# Patient Record
Sex: FEMALE | Race: WHITE | NOT HISPANIC OR LATINO
[De-identification: names, ages, dates, MRNs, and addresses within clinical notes are randomized per-mention and may not be internally consistent; named-entity substitution may affect disease eponyms.]

---

## 2017-08-01 ENCOUNTER — APPOINTMENT (OUTPATIENT)
Dept: OBGYN | Facility: CLINIC | Age: 62
End: 2017-08-01
Payer: COMMERCIAL

## 2017-08-01 VITALS
HEIGHT: 63 IN | WEIGHT: 153 LBS | SYSTOLIC BLOOD PRESSURE: 160 MMHG | BODY MASS INDEX: 27.11 KG/M2 | DIASTOLIC BLOOD PRESSURE: 80 MMHG

## 2017-08-01 PROCEDURE — 99396 PREV VISIT EST AGE 40-64: CPT

## 2017-08-03 LAB — HPV HIGH+LOW RISK DNA PNL CVX: NEGATIVE

## 2017-08-04 LAB — CYTOLOGY CVX/VAG DOC THIN PREP: NORMAL

## 2018-02-05 ENCOUNTER — EMERGENCY (EMERGENCY)
Facility: HOSPITAL | Age: 63
LOS: 0 days | Discharge: HOME | End: 2018-02-05
Attending: EMERGENCY MEDICINE | Admitting: EMERGENCY MEDICINE
Payer: COMMERCIAL

## 2018-02-05 VITALS
HEART RATE: 83 BPM | OXYGEN SATURATION: 98 % | RESPIRATION RATE: 17 BRPM | DIASTOLIC BLOOD PRESSURE: 67 MMHG | SYSTOLIC BLOOD PRESSURE: 146 MMHG

## 2018-02-05 VITALS — WEIGHT: 156.97 LBS

## 2018-02-05 DIAGNOSIS — M25.571 PAIN IN RIGHT ANKLE AND JOINTS OF RIGHT FOOT: ICD-10-CM

## 2018-02-05 DIAGNOSIS — S93.401A SPRAIN OF UNSPECIFIED LIGAMENT OF RIGHT ANKLE, INITIAL ENCOUNTER: ICD-10-CM

## 2018-02-05 DIAGNOSIS — Z88.8 ALLERGY STATUS TO OTHER DRUGS, MEDICAMENTS AND BIOLOGICAL SUBSTANCES STATUS: ICD-10-CM
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
## 2018-02-05 DIAGNOSIS — Z88.0 ALLERGY STATUS TO PENICILLIN: ICD-10-CM

## 2018-02-05 PROCEDURE — 99284 EMERGENCY DEPT VISIT MOD MDM: CPT

## 2018-02-05 PROCEDURE — 73630 X-RAY EXAM OF FOOT: CPT | Mod: 26,LT

## 2018-02-05 PROCEDURE — 73610 X-RAY EXAM OF ANKLE: CPT | Mod: 26,RT

## 2018-02-05 RX ORDER — IBUPROFEN 200 MG
600 TABLET ORAL ONCE
Qty: 0 | Refills: 0 | Status: COMPLETED | OUTPATIENT
Start: 2018-02-05 | End: 2018-02-05

## 2018-02-05 RX ADMIN — Medication 600 MILLIGRAM(S): at 17:19

## 2018-02-05 NOTE — ED STATDOCS - CARE PLAN
Principal Discharge DX:	Sprain of right ankle, initial encounter  Secondary Diagnosis:	Foot injury, right, initial encounter

## 2018-02-05 NOTE — ED STATDOCS - MEDICAL DECISION MAKING DETAILS
ankle injury s/p hyper plantar flexion. Likely sprain however due to inability to bear weight will do x-ray, give Ibuprofen and reassess.

## 2018-02-05 NOTE — ED ADULT NURSE NOTE - OBJECTIVE STATEMENT
pt presents to ED with rigth ankle pain s/p slip and fall on ice today. pt denies hitting her head. denies LOC. pt denies use of anticoagulants. a&ox3. pt ambulates with pain. will continue to monitor and reassess

## 2018-02-05 NOTE — ED STATDOCS - NS_ ATTENDINGSCRIBEDETAILS _ED_A_ED_FT
Bobby Seo DO (Attending): The history, relevant review of systems, past medical and surgical history, medical decision making, and physical examination was documented by the scribe in my presence and I attest to the accuracy of the documentation.

## 2018-02-05 NOTE — ED STATDOCS - MUSCULOSKELETAL, MLM
Right ankle: tenderness to anterior medial ankle. Minimal bony tenderness. Tenderness worth with hyper plantar flexion.

## 2018-02-05 NOTE — ED STATDOCS - PROGRESS NOTE DETAILS
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 State only pain located in the anterior R ankle. Small area of redness to the dorsal midfoot without ttp. Likely sprain. -Wali Nesbitt PA-C

## 2018-08-27 ENCOUNTER — APPOINTMENT (OUTPATIENT)
Dept: OBGYN | Facility: CLINIC | Age: 63
End: 2018-08-27
Payer: COMMERCIAL

## 2018-08-27 VITALS
WEIGHT: 157 LBS | HEIGHT: 63 IN | DIASTOLIC BLOOD PRESSURE: 80 MMHG | SYSTOLIC BLOOD PRESSURE: 150 MMHG | BODY MASS INDEX: 27.82 KG/M2

## 2018-08-27 DIAGNOSIS — N95.2 POSTMENOPAUSAL ATROPHIC VAGINITIS: ICD-10-CM

## 2018-08-27 PROCEDURE — 99396 PREV VISIT EST AGE 40-64: CPT

## 2018-08-27 PROCEDURE — 99213 OFFICE O/P EST LOW 20 MIN: CPT | Mod: 25

## 2018-11-09 DIAGNOSIS — R14.0 ABDOMINAL DISTENSION (GASEOUS): ICD-10-CM

## 2019-06-05 ENCOUNTER — RESULT REVIEW (OUTPATIENT)
Age: 64
End: 2019-06-05

## 2019-11-06 ENCOUNTER — TRANSCRIPTION ENCOUNTER (OUTPATIENT)
Age: 64
End: 2019-11-06

## 2019-11-07 ENCOUNTER — APPOINTMENT (OUTPATIENT)
Dept: OBGYN | Facility: CLINIC | Age: 64
End: 2019-11-07
Payer: COMMERCIAL

## 2019-11-07 VITALS
SYSTOLIC BLOOD PRESSURE: 132 MMHG | TEMPERATURE: 98 F | WEIGHT: 157 LBS | HEIGHT: 63 IN | DIASTOLIC BLOOD PRESSURE: 78 MMHG | BODY MASS INDEX: 27.82 KG/M2

## 2019-11-07 PROCEDURE — 99396 PREV VISIT EST AGE 40-64: CPT

## 2019-11-07 NOTE — HISTORY OF PRESENT ILLNESS
[1 Year Ago] : 1 year ago [Regular Exercise] : regular exercise [Healthy Diet] : a healthy diet [Last Mammogram ___] : Last Mammogram was [unfilled] [Last Colonoscopy ___] : Last colonoscopy [unfilled] [Last Bone Density ___] : Last bone density studies [unfilled] [Last Pap ___] : Last cervical pap smear was [unfilled] [Weight Concerns] : no concerns with her weight

## 2019-11-07 NOTE — CHIEF COMPLAINT
[Annual Visit] : annual visit [FreeTextEntry1] : Pt here for annual visit. Pt complains of left nipple "splitting" over past few months has seen dermatologist about this and he thinks it is psoriasis. Has been putting A&D ointment to help. Pt reports its bothersome and has appt with the dermatologist next week. No urinary complaints. No vaginal bleeding. Taking vitamin D, Pt walks daily. No blood in stool. Pt currently SA, estrace cream caused cramping after using it for one week and stopped. Occasional hot flashes and night sweats, tolerable.

## 2019-11-07 NOTE — PHYSICAL EXAM
[Awake] : awake [Alert] : alert [Acute Distress] : no acute distress [Thyroid Nodule] : no thyroid nodule [LAD] : no lymphadenopathy [Goiter] : no goiter [Mass] : no breast mass [Nipple Discharge] : no nipple discharge [Axillary LAD] : no axillary lymphadenopathy [No Discharge] : no discharge [Tender] : non tender [Soft] : soft [Distended] : not distended [H/Smegaly] : no hepatosplenomegaly [Oriented x3] : oriented to person, place, and time [Depressed Mood] : not depressed [Flat Affect] : affect not flat [No Bleeding] : there was no active vaginal bleeding [Normal] : uterus [Pap Obtained] : a Pap smear was performed [Normal Position] : in a normal position [Tenderness] : nontender [Enlarged ___ wks] : not enlarged [Mass ___ cm] : no uterine mass was palpated [Uterine Adnexae] : were not tender and not enlarged [Adnexa Tenderness] : were not tender [No Tenderness] : no rectal tenderness [Ovarian Mass (___ Cm)] : there were no adnexal masses [RRR, No Murmurs] : RRR, no murmurs [Occult Blood] : occult blood test from digital rectal exam was negative [CTAB] : CTAB

## 2019-11-14 LAB — HPV HIGH+LOW RISK DNA PNL CVX: NOT DETECTED

## 2020-10-06 ENCOUNTER — TRANSCRIPTION ENCOUNTER (OUTPATIENT)
Age: 65
End: 2020-10-06

## 2020-12-04 ENCOUNTER — TRANSCRIPTION ENCOUNTER (OUTPATIENT)
Age: 65
End: 2020-12-04

## 2020-12-10 NOTE — ED STATDOCS - OBJECTIVE STATEMENT
[facility] Hospital Medicine  Progress Note    Patient Name: Navin Beck  MRN: [id number]  Patient Class: IP- Inpatient   Admission Date: 11/26/2020  Length of Stay: 13 days  Attending Physician: Alessia Johnston MD  Primary Care Provider: Elvira Quinones MD    [facility] Medicine Team: [facility]OS[facility] MED 2 Stefano Dimas MD    Subjective:     Principal Problem:Acute kidney injury (LAURIE) with acute tubular necrosis (ATN)        HPI:  Ms. Beck is a 62yo F with PMH of HTN, HLD, T2DM, Hyperthyroidism, and Asthma who had recent GYN surgery on 10/26 due to erosion of bladder mesh / prosthetic material with Cifuentes removal on 11/19. She presents to [facility] due to 4-5 days of not feeling well, she has had loss of appetite and weakness with 10/10 throbbing lower abdominal pain associated with vaginal pain and rectal pressure and non-bloody diarrhea. She has had trouble urinating for the past 5 days as well. She takes oxycodone 5 at home for pain, but it did not help. She endorses nausea and vomiting (4 times, non-bloody) also associated with the pain. She denies fever, chills, chest pain, SOB, trouble breathing, leg swelling, or any other symptom at this time.    ED Course: VSS, afebrile but leukocytosis at 17.85. Pt found to have DKA - Gluc 626, AG16, BHB 4.2, Fluids, insulin gtt started and Glucose down-trended to 200s. UA showed 2RBC, >100 WBC, bacteria, given CTX. CT A/P showed multiple fluid collections adjacent to bladder concerning for bladder perf with multiple urinomas, with mild bilateral hydronephrosis. Urology consulted and placed a Cifuentes with 900 UOP drained. GYN consulted due to recent surgery.     Pt will be admitted to hospital medicine for further management of DKA and possible intra-abdominal infection.     Overview/Hospital Course:  Pt was admitted on 11/28 due to 10/10 abdominal pain and also found to be in DKA. Pt was started on IVF, insulin gtt, and kept NPO with glucose  "downtrending and AG closed. CT abdomen showed multiple fluid collections near bladder. Renal cystography with "hypoattenuating collections in the lower pelvis adjacent to the urinary bladder with no definite extravasated contrast material appreciated within the pelvic collections to suggest communication with the bladder".  Urology and GYN on board, agree with plan for IR to place drain in fluid collections for culture and source control. Pt was started on CTX and Flagyl to cover for UTI and intra-abdominal infection, broadened to Cefepime, and Flagyl on 11/26; vanc added later. Endocrine consulted and managed the patient while acutely ill on insulin drip then transitioned to SQ when BG stabilized. IR place low abdominal drain to abscess on 11/28 with 250ml purulent material immediately expressed. Drained remained in place after the procedure. Urology continued to assess patient and recommended CT RSS after identifying some left CVAT and increased SCr with reduced UOP on 11/29, no hydronephrosis does not believe LAURIE is post-renal in nature. 11/30 pt remains anuric, Cr up-trending to 4.2, with hyponatremia. Nephrology consulted. 12/1 pt remains anuric after Lasix + Diuril challenge. Nephrology decided for initiation of CRRT due to low pressures with unresponsive ARF. Will transfer to ICU for CRRT. Pt transferred back to floor as no more need for CRRT on 12/5. Cystoscopy with retrograde pyleogram for 12/7 by Urology    Interval History/Significant Events:   Weaning O2 with goal of 88%-90% due to history of lung disease. Patient afebrile and reports improvement in abdominal pain.     Review of Systems   Constitutional: Positive for activity change, appetite change and fatigue. Negative for chills and fever.   HENT: Negative for hearing loss, rhinorrhea, sneezing, sore throat and trouble swallowing.    Respiratory: Negative for cough, shortness of breath and wheezing.    Cardiovascular: Negative for chest pain, " palpitations and leg swelling.   Gastrointestinal: Negative for abdominal pain, blood in stool, constipation, diarrhea, nausea, rectal pain and vomiting.   Genitourinary: Negative for difficulty urinating, dysuria, flank pain and pelvic pain.   Musculoskeletal: Negative for arthralgias, neck pain and neck stiffness.   Skin: Negative for color change, pallor and rash.   Neurological: Negative for syncope, weakness, light-headedness, numbness and headaches.   Psychiatric/Behavioral: Negative for confusion and hallucinations. The patient is not nervous/anxious.      Objective:     Vital Signs (Most Recent):  Temp: 97.9 °F (36.6 °C) (12/10/20 0733)  Pulse: 76 (12/10/20 1500)  Resp: 16 (12/10/20 1437)  BP: (!) 140/65 (12/10/20 0733)  SpO2: 96 % (12/10/20 0837) Vital Signs (24h Range):  Temp:  [97.9 °F (36.6 °C)-99.2 °F (37.3 °C)] 97.9 °F (36.6 °C)  Pulse:  [] 76  Resp:  [16-20] 16  SpO2:  [95 %-99 %] 96 %  BP: (122-140)/(56-65) 140/65   Weight: 109.5 kg (241 lb 6.5 oz)  Body mass index is 37.81 kg/m².      Intake/Output Summary (Last 24 hours) at 12/10/2020 1529  Last data filed at 12/10/2020 1100  Gross per 24 hour   Intake 300 ml   Output 2680 ml   Net -2380 ml       Physical Exam  Constitutional:       General: She is not in acute distress.     Appearance: She is obese. She is not ill-appearing or toxic-appearing.   HENT:      Head: Normocephalic and atraumatic.      Nose: No congestion or rhinorrhea.      Mouth/Throat:      Pharynx: No oropharyngeal exudate or posterior oropharyngeal erythema.   Eyes:      General: No scleral icterus.        Right eye: No discharge.         Left eye: No discharge.      Extraocular Movements: Extraocular movements intact.      Pupils: Pupils are equal, round, and reactive to light.   Neck:      Musculoskeletal: Normal range of motion and neck supple. No neck rigidity or muscular tenderness.   Cardiovascular:      Rate and Rhythm: Normal rate and regular rhythm.      Pulses:  Normal pulses.      Heart sounds: No murmur.   Pulmonary:      Effort: Pulmonary effort is normal. No respiratory distress.      Breath sounds: Normal breath sounds. No stridor. No wheezing.   Abdominal:      General: Abdomen is flat. Bowel sounds are normal. There is no distension.      Palpations: Abdomen is soft. There is no mass.      Tenderness: There is abdominal tenderness (LLQ, drain in place with minimal pustular yellow fluid, dressings CDI).      Hernia: No hernia is present.   Musculoskeletal: Normal range of motion.         General: No swelling, tenderness or deformity.   Skin:     General: Skin is warm and dry.      Coloration: Skin is not jaundiced or pale.      Findings: No bruising.   Neurological:      General: No focal deficit present.      Mental Status: She is alert. Mental status is at baseline.         Vents:  Oxygen Concentration (%): 28 (12/08/20 0211)  Lines/Drains/Airways     Drain                 Closed/Suction Drain 11/28/20 1358 Right Abdomen Bulb 10 Fr. 12 days         Urethral Catheter 12/07/20 1413 Latex;Straight-tip 18 Fr. 3 days          Peripheral Intravenous Line                 Peripheral IV - Single Lumen 12/07/20 20 G Right;Lateral Upper Arm 3 days              Significant Labs:    CBC/Anemia Profile:  Recent Labs   Lab 12/09/20  0909 12/10/20  0301   WBC 11.50 9.67   HGB 9.0* 8.2*   HCT 29.8* 26.9*   * 354*   MCV 95 94   RDW 14.8* 14.8*        Chemistries:  Recent Labs   Lab 12/09/20  0909 12/10/20  0300   * 135*   K 3.6 4.2   CL 93* 97   CO2 31* 28   BUN 27* 30*   CREATININE 4.7* 4.4*   CALCIUM 7.9* 7.6*   ALBUMIN 2.2* 1.9*   PROT 7.1 6.3   BILITOT 0.1 0.1   ALKPHOS 566* 410*   ALT 9* 6*   AST 29 13   MG 1.4* 1.7   PHOS 5.3* 4.7*       BMP:   Recent Labs   Lab 12/10/20  0300   *   *   K 4.2   CL 97   CO2 28   BUN 30*   CREATININE 4.4*   CALCIUM 7.6*   MG 1.7     CMP:   Recent Labs   Lab 12/09/20  0909 12/10/20  0300   * 135*   K 3.6 4.2   CL  93* 97   CO2 31* 28   * 311*   BUN 27* 30*   CREATININE 4.7* 4.4*   CALCIUM 7.9* 7.6*   PROT 7.1 6.3   ALBUMIN 2.2* 1.9*   BILITOT 0.1 0.1   ALKPHOS 566* 410*   AST 29 13   ALT 9* 6*   ANIONGAP 11 10   EGFRNONAA 9.3* 10.1*       Significant Imaging:  I have reviewed all pertinent imaging results/findings within the past 24 hours.      Assessment/Plan:      * Acute kidney injury (LAURIE) with acute tubular necrosis (ATN)  Cr 1.5 on presentation.   Likely multi-factorial on presentation 2/2 pre-renal volume depletion in the context of DKA, UTI, and post-renal obstruction  Repeat LAURIE on 11/29 likely intrinsic ATN in etiology    Recent Labs   Lab 12/06/20  0752 12/07/20  0511 12/08/20  0354 12/09/20  0909 12/10/20  0300   CREATININE 4.5* 5.1* 5.3* 4.7* 4.4*     PLAN  - Initial LAURIE on presentation resolved with IVF and placement of Cifuentes with resolution of hydronephrosis on imaging  - UTI adequately treated as she has been on continued ABX for other indications  - Nephrology consulted, appreciate recs   - Initiation of CRRT, transfer to ICU on 12/2   - Transferred to floor on 12/4   - Pt made ~1L urine after Lasix + Diuril challenge   - May be in recovery phase of LAURIE   - Renally dose meds   - Avoid renal toxins (ACEi/ARB, NSAIDs, contrast, etc.)   - Strict I&Os   - Trialysis catheter to RIJ removed on 12/9    Abnormal computed tomography of bladder  Pt presented to [facility] due to 5-6 days of 10/10 lower abdomen, vaginal, and rectal pain. Pt has recent h/o GYN surgery in 10/2020 for bladder mesh removal, Cifuentes was removed on 11/19.  CT A/P 11/27: Multiple fluid collections adjacent to bladder. Mild bilateral hydronephrosis.   CT A/P 12/8: Interval resolution of previously identified multilobular fluid collection in the lower pelvis with drainage catheter in position.  Residual scattered small volume of air is noted.   UCx 11/26: E.coli sensitive to CTX, cefepime   BCx 12/5: Peptostrptococcus   Cystography 12/6: No  bladder leak    PLAN  - GYN consulted, appreciate consult.  - IR consulted, appreciate recs; Drain in places.  - Pain management: PCA pump initially. PCA pump d/c on 11/30. PRN opiates  - ID consulted for intra-abdominal infection, appreciate recs   - CTX & Flagyl x14 days, repeat imaging to assess for cleared infection prior to d/c abx   - If d/c before 14d course, may switch to augmentin   - 12/9: broadened ABX to cover HAP, switched CTX-->Cefepime, added vanc, and continued flagyl              - 12/10: Transitioned patient levaquin + metronidazole.   - Urology recs appreciated; left CVAT, ordered CT RSS 11/29 hydronephrosis resolved   - Maintain Cifuentes    - 12/6 spoke with urology concerning ROSAURA drain output concern for urinary contents.    - Per Urology, since ROSAURA fluid Cr 1.7 is less than serum Cr 4.5, the fluid is not urine.    - Negative cystogram and normal retrograde pyelogram        HAP (hospital-acquired pneumonia)  Hospital acquired pneumonia (HAP)    Patient on 3L NC, although sat 97-98%, likely can wean significantly. Patient with longstanding obstructive lung disease (asthma) -- goal sat >88%.    12/7 CXR: Interval progression in the extent of right perihilar and right lower lung zone infiltrate and or atelectasis.  New and or mildly increased small right pleural effusion.   12/8 CTAP: Bibasilar consolidative changes, right more than left with small volume right-sided pleural effusion.  Findings likely related to pneumonia versus inflammatory process.    12/9 CT Chest w/o Contrast:   · Bilateral lower lobe consolidative opacities right greater than left and bandlike atelectasis bilaterally with trace pleural effusions similar to exam 12/08/2020.  Differential diagnosis includes aspiration, pneumonia, atelectasis, and less likely noninfectious inflammation.  · Slight ground-glass opacities predominantly right apex which may represent infectious or noninfectious inflammatory etiology.   · Partially aerated  retained secretions within the trachea.   · Scattered calcified mediastinal lymph nodes and soft tissue density in the anterior mediastinum relatively unchanged dating back to 07/27/2012.     - Initially Abx broadened due to concern for HCAP given oxygen sats, however given that patient is afebrile, history of Asthma, and bilateral nature of consolidative changes, this may be atelectasis. Will start patient on Levaquin and monitor overnight. If afebrile will discharge patient on Levaquin.  - Flagyl is for pelvic abscess, but will also cover anaerobes if aspiration   - Tracheal secretions in airway noted on CT -- will add BID cough assists for help with expectoration  - wean supplemental O2 for goal sat >88% (pt with chronic obstructive lung disease - asthma)    Yeast infection  - Fluconazole 150mg PO    Elevated alkaline phosphatase level  Alk Phos has been trending up this hospitalization, on 12/9 . Unclear etiology. Other LFTs wnl - T bili, AST/ALT.  Recent Labs   Lab 12/10/20  0300   ALT 6*   AST 13   ALKPHOS 410*   BILITOT 0.1   PROT 6.3   ALBUMIN 1.9*     Recent Labs   Lab 12/09/20  0909   *     - Will review medication list to evaluate for possible drug-induced elevation    Anemia  Pt Hb 9.8 on admit. BL ~10.   Most likely due to anemia of chronic disease, acute blood loss, and frequent phlebotomy while hospitalized.   Recent Labs   Lab 12/07/20  0511 12/08/20  0354 12/09/20  0909 12/10/20  0301   HGB 7.9* 8.0* 9.0* 8.2*     - Required 1U PRBC on 12/2  - Required 1U PRBC on 12/6 2/2     Impaired mobility and endurance    - PT/OT    Hyponatremia  Earlier this howpitalizarion hyponatremia to 120s with spontaneous jerking motions 11/29. Na now normalized. Nephrology continuing to follow for LAURIE. Possibly 2/2 D5 IVF with decreased UOP while in DKA. Fluid restrict 1.5L  Recent Labs   Lab 12/06/20  0752 12/07/20  0511 12/08/20  0354 12/09/20  0909 12/10/20  0300   * 136 134* 135* 135*       -  Nephrology on board, appreciate recs      Postprocedural intraabdominal abscess  Mesh removed by GYN 10/26; march placed  March removed 11/19 --> increased pain  Admitted 11/27 with worsening abd pain found to be related to intra-abd fluid collections.   UCx 11/26 with E.coli    - Urology consulted; lesions adjacent to bladder per CT cysto 11/27  - Gyn consulted  - IR consulted drained abscess 11/28  - Cultures: Peptostreptococcus pending margy.  - Broad spectrum abx: CTX, Flagyl D11/14 --> broadened to vanc/cefepime/flagyl 12/9 to cover HAP  --> Now on levaquin/flagyl      UTI (urinary tract infection)  Pt presented with lower abdominal/vaginal/rectal pain with dysuria and urinary retention.   UA: 2 RBC, >100 WBC, 2+ leukocytes, bacteria  Recent UTIs with pan-sensitive E coli.    - UCx with E coli sens to cefepime and ceftriaxone  - March placed for urinary retention, maintain March per Urology  - Remains on ABX for abscess and now HAP -- UTI has been adequately treated at this point      Urinary retention  Pt has recent h/o GYN surgery in 10/2020 for bladder mesh removal, March was removed on 11/19. For the past few days pt has had increased trouble urinating associated with pain.   CT A/P 11/27: Multiple fluid collections adjacent to bladder. Mild bilateral hydronephrosis.   Retention likely 2/2 anatomic obstruction, pelvic fluid collection, diabetic bladder  - Urology consulted in ED, appreciate recs   - March placed 800cc drained, bladder irrigated and all fluid removed with ease   - Maintain March   - No hydronephrosis seen on CT, most likely not post-renal in etiology    Type 2 diabetes mellitus with hyperglycemia, with long-term current use of insulin  Last Hb A1c 10.3 on 9/25/20  Home regimen: Metformin 500 BID, Aspart 15U TID, Glargine 60 BID  Pt says her blood sugar has been running to the 300s for the past few days  - Hold oral anti-glycemics while hospitalized  - Endocrine consulted, recs appreciated  -  63 y/o female with no relevant PMHx presents to the ED c/o right ankle pain s/p trip and fall this morning. Pt reports severe pain when standing and is ambulatory with pain. Pt has an aching pain in right calf muscle. No fever or any other acute complaints at this time. Pt took 3 81mg ASA for pain at 2:30pm. Pt is a physical therapist. Allergic to Penicillin, Azithromycin, Demerol. No daily medications. Pt presents with ace bandage. PMD: Dr. Tahir Henry. Accu-checks TIDAC + qhs  - See DKA notes      Asthma  Pt states she uses Albuterol inhaler TID every day.   - Duonebs q6 PRN    Diabetic ketoacidosis without coma associated with type 2 diabetes mellitus  Pt presented to [facility] [place] [place] ED with Gluc 626, HCO3 16, AG 16, BHB 4.2, ketonuria. 2L IVF given in ED with insulin gtt started. Glucose trended downward to 274. Pt was then admitted to hospital medicine for further management of DKA.      - Endocrine consulted, appreciate recs   - Transitional Insulin gtt stopped 12/1   - Gluc regimen increased 2/2 gluc 230-240s    Detemir 34U BID    Aspart 17U TID WM   - Goal gluc 140-180   - Diabetic diet  - BMP daily  - Will replace K PRN  - Nausea: zofran PRN     Hyperthyroidism  Continue home Methimazole      - TSH WNL  - Endocrine aware    Severe obesity (BMI 35.0-35.9 with comorbidity)  -  on exercise, diet, and weight management     Mixed hyperlipidemia  - Continue home statin      Essential hypertension  Hold home Benazepril due to LAURIE, low BP    - Will monitor BP and add anti-hypertensive as necessary       VTE Risk Mitigation (From admission, onward)         Ordered     heparin (porcine) injection 5,000 Units  Every 8 hours      12/01/20 1426     Place sequential compression device  Until discontinued      12/01/20 1409     IP VTE HIGH RISK PATIENT  Once      12/01/20 1409                Discharge Planning   RAFA: 12/14/2020     Code Status: Full Code   Is the patient medically ready for discharge?: No    Reason for patient still in hospital (select all that apply): Patient trending condition  Discharge Plan A: Home with family   Discharge Delays: None known at this time              Stefano Dimas MD  Department of Hospital Medicine   [facility]

## 2021-01-15 ENCOUNTER — APPOINTMENT (OUTPATIENT)
Dept: OBGYN | Facility: CLINIC | Age: 66
End: 2021-01-15
Payer: COMMERCIAL

## 2021-01-15 VITALS
BODY MASS INDEX: 27.82 KG/M2 | WEIGHT: 157 LBS | SYSTOLIC BLOOD PRESSURE: 130 MMHG | HEIGHT: 63 IN | DIASTOLIC BLOOD PRESSURE: 70 MMHG

## 2021-01-15 DIAGNOSIS — Z01.419 ENCOUNTER FOR GYNECOLOGICAL EXAMINATION (GENERAL) (ROUTINE) W/OUT ABNORMAL FINDINGS: ICD-10-CM

## 2021-01-15 DIAGNOSIS — Z12.31 ENCOUNTER FOR SCREENING MAMMOGRAM FOR MALIGNANT NEOPLASM OF BREAST: ICD-10-CM

## 2021-01-15 PROCEDURE — 99396 PREV VISIT EST AGE 40-64: CPT

## 2021-01-15 PROCEDURE — 99072 ADDL SUPL MATRL&STAF TM PHE: CPT

## 2021-01-15 RX ORDER — LOSARTAN POTASSIUM 25 MG/1
25 TABLET, FILM COATED ORAL
Refills: 0 | Status: ACTIVE | COMMUNITY

## 2021-01-15 RX ORDER — ESTRADIOL 0.1 MG/G
0.1 CREAM VAGINAL
Qty: 1 | Refills: 2 | Status: DISCONTINUED | COMMUNITY
Start: 2018-08-27 | End: 2021-01-15

## 2021-01-15 NOTE — HISTORY OF PRESENT ILLNESS
[No] : Patient does not have concerns regarding sex [Currently Active] : currently active [Men] : men [Vaginal] : vaginal [TextBox_4] : BMD last yr showed osteoporosis, she had  FU with  endocrine no meds rx'd\par Dribbling began over the summer, urine in underwear, no dysuria\par RHINA with sneezing, stable\par no urge incontinence, not bothering her.\par Fissure on left nipple when seasons change, coconut oil helps\par no vb\par sa recently , no pain or dryness\par  [Mammogramdate] : 2/19 [PapSmeardate] : 2019 [BoneDensityDate] : 12/19 [TextBox_43] : due in March [ColonoscopyDate] : 6/19

## 2021-01-16 LAB — HPV HIGH+LOW RISK DNA PNL CVX: NOT DETECTED

## 2021-01-21 ENCOUNTER — TRANSCRIPTION ENCOUNTER (OUTPATIENT)
Age: 66
End: 2021-01-21

## 2021-02-25 DIAGNOSIS — N64.89 OTHER SPECIFIED DISORDERS OF BREAST: ICD-10-CM

## 2021-11-10 ENCOUNTER — TRANSCRIPTION ENCOUNTER (OUTPATIENT)
Age: 66
End: 2021-11-10

## 2021-12-13 ENCOUNTER — TRANSCRIPTION ENCOUNTER (OUTPATIENT)
Age: 66
End: 2021-12-13

## 2021-12-14 ENCOUNTER — TRANSCRIPTION ENCOUNTER (OUTPATIENT)
Age: 66
End: 2021-12-14

## 2024-03-12 ENCOUNTER — TELEPHONE (OUTPATIENT)
Dept: INTERNAL MEDICINE CLINIC | Facility: CLINIC | Age: 69
End: 2024-03-12

## 2024-03-12 NOTE — TELEPHONE ENCOUNTER
Patient requested a new patient appointment for and here  but only wanted to schedule with Dr. Freitas and said she would try back later

## 2024-04-10 ENCOUNTER — INITIAL CONSULT (OUTPATIENT)
Age: 69
End: 2024-04-10
Payer: MEDICARE

## 2024-04-10 VITALS
BODY MASS INDEX: 27.46 KG/M2 | HEIGHT: 63 IN | WEIGHT: 155 LBS | DIASTOLIC BLOOD PRESSURE: 78 MMHG | SYSTOLIC BLOOD PRESSURE: 122 MMHG | HEART RATE: 73 BPM

## 2024-04-10 DIAGNOSIS — R94.31 ABNORMAL EKG: Primary | ICD-10-CM

## 2024-04-10 DIAGNOSIS — R94.31 EKG, ABNORMAL: ICD-10-CM

## 2024-04-10 DIAGNOSIS — I10 PRIMARY HYPERTENSION: ICD-10-CM

## 2024-04-10 PROCEDURE — 99204 OFFICE O/P NEW MOD 45 MIN: CPT | Performed by: INTERNAL MEDICINE

## 2024-04-10 PROCEDURE — 1090F PRES/ABSN URINE INCON ASSESS: CPT | Performed by: INTERNAL MEDICINE

## 2024-04-10 PROCEDURE — 3074F SYST BP LT 130 MM HG: CPT | Performed by: INTERNAL MEDICINE

## 2024-04-10 PROCEDURE — 3078F DIAST BP <80 MM HG: CPT | Performed by: INTERNAL MEDICINE

## 2024-04-10 PROCEDURE — 1036F TOBACCO NON-USER: CPT | Performed by: INTERNAL MEDICINE

## 2024-04-10 PROCEDURE — G8428 CUR MEDS NOT DOCUMENT: HCPCS | Performed by: INTERNAL MEDICINE

## 2024-04-10 PROCEDURE — 1123F ACP DISCUSS/DSCN MKR DOCD: CPT | Performed by: INTERNAL MEDICINE

## 2024-04-10 PROCEDURE — 93000 ELECTROCARDIOGRAM COMPLETE: CPT | Performed by: INTERNAL MEDICINE

## 2024-04-10 PROCEDURE — G8400 PT W/DXA NO RESULTS DOC: HCPCS | Performed by: INTERNAL MEDICINE

## 2024-04-10 PROCEDURE — 3017F COLORECTAL CA SCREEN DOC REV: CPT | Performed by: INTERNAL MEDICINE

## 2024-04-10 PROCEDURE — G8419 CALC BMI OUT NRM PARAM NOF/U: HCPCS | Performed by: INTERNAL MEDICINE

## 2024-04-10 RX ORDER — PERPHENAZINE/AMITRIPTYLINE HCL 2 MG-10 MG
4 TABLET ORAL DAILY
COMMUNITY

## 2024-04-10 RX ORDER — BIOTIN 1 MG
TABLET ORAL DAILY
COMMUNITY

## 2024-04-10 RX ORDER — SODIUM FLUORIDE 1.1 G/100G
CREAM ORAL
COMMUNITY
Start: 2024-02-05

## 2024-04-10 RX ORDER — EPINEPHRINE 0.3 MG/.3ML
0.3 INJECTION SUBCUTANEOUS
COMMUNITY
Start: 2022-02-09

## 2024-04-10 RX ORDER — LOSARTAN POTASSIUM 25 MG/1
25 TABLET ORAL DAILY
COMMUNITY
Start: 2024-02-09

## 2024-04-10 NOTE — PROGRESS NOTES
[address] [facility], [place] [address]  PHONE: [phone]        04/10/24    NAME:  Marylu Forde  : 1955  MRN: [id number]       SUBJECTIVE:   Marylu Forde is a 69 y.o. female seen for a consultation visit regarding the following:     Chief Complaint   Patient presents with    Consultation    Hypertension          HPI:  Consultation is requested by Roberto Carlos Hill MD for evaluation of Consultation and Hypertension  Here for abnormal EKG.  CA score 11 in .    Some msk issues now, walking some now.  No angina now.  No KAMARA, SOB, CP.  5-10k steps per day.   Patient denies recent history of orthopnea, PND, excessive dizziness and/or syncope.      Retired PT      Past Medical History, Past Surgical History, Family history, Social History, and Medications were all reviewed with the patient today and updated as necessary.       Current Outpatient Medications   Medication Sig Dispense Refill    losartan (COZAAR) 25 MG tablet Take 1 tablet by mouth daily      Biotin 1000 MCG TABS Take by mouth daily      Calcium Fructoborate POWD Take 1 capsule by mouth daily      EPINEPHrine (EPIPEN) 0.3 MG/0.3ML SOAJ injection Inject 0.3 mLs into the muscle once as needed      DENTA 5000 PLUS 1.1 % CREA USE AS DIRECTED      Astaxanthin 4 MG CAPS Take 4 mg by mouth daily      TURMERIC PO Take 1 capsule by mouth daily      Cyanocobalamin (VITAMIN B 12 PO) Take by mouth      Magnesium Chloride (MAGNESIUM DR PO) Take by mouth      Calcium-Vitamin D-Vitamin K (CALCIUM FOR WOMEN PO) Take by mouth       No current facility-administered medications for this visit.        Allergies   Allergen Reactions    Penicillins Shortness Of Breath    Erythromycin Nausea And Vomiting     Patient Active Problem List    Diagnosis Date Noted    EKG, abnormal 04/10/2024    Primary hypertension 04/10/2024      No past surgical history on file.  Family History   Problem Relation Age of Onset    Arrhythmia Brother

## 2024-07-17 ENCOUNTER — OFFICE VISIT (OUTPATIENT)
Age: 69
End: 2024-07-17
Payer: MEDICARE

## 2024-07-17 VITALS
SYSTOLIC BLOOD PRESSURE: 118 MMHG | WEIGHT: 157 LBS | HEART RATE: 71 BPM | BODY MASS INDEX: 27.82 KG/M2 | DIASTOLIC BLOOD PRESSURE: 70 MMHG | HEIGHT: 63 IN

## 2024-07-17 DIAGNOSIS — I10 PRIMARY HYPERTENSION: Primary | ICD-10-CM

## 2024-07-17 DIAGNOSIS — R94.31 EKG, ABNORMAL: ICD-10-CM

## 2024-07-17 PROCEDURE — 1090F PRES/ABSN URINE INCON ASSESS: CPT | Performed by: INTERNAL MEDICINE

## 2024-07-17 PROCEDURE — 3017F COLORECTAL CA SCREEN DOC REV: CPT | Performed by: INTERNAL MEDICINE

## 2024-07-17 PROCEDURE — 3078F DIAST BP <80 MM HG: CPT | Performed by: INTERNAL MEDICINE

## 2024-07-17 PROCEDURE — 99213 OFFICE O/P EST LOW 20 MIN: CPT | Performed by: INTERNAL MEDICINE

## 2024-07-17 PROCEDURE — G8428 CUR MEDS NOT DOCUMENT: HCPCS | Performed by: INTERNAL MEDICINE

## 2024-07-17 PROCEDURE — 1036F TOBACCO NON-USER: CPT | Performed by: INTERNAL MEDICINE

## 2024-07-17 PROCEDURE — 3074F SYST BP LT 130 MM HG: CPT | Performed by: INTERNAL MEDICINE

## 2024-07-17 PROCEDURE — G8419 CALC BMI OUT NRM PARAM NOF/U: HCPCS | Performed by: INTERNAL MEDICINE

## 2024-07-17 PROCEDURE — G8400 PT W/DXA NO RESULTS DOC: HCPCS | Performed by: INTERNAL MEDICINE

## 2024-07-17 PROCEDURE — 1123F ACP DISCUSS/DSCN MKR DOCD: CPT | Performed by: INTERNAL MEDICINE

## 2024-07-17 NOTE — PROGRESS NOTES
[address] [facility], [place] [address]  PHONE: [phone]     24    NAME:  Marylu Forde  : 1955  MRN: [id number]       SUBJECTIVE:   Marylu Forde is a 69 y.o. female seen for a follow up visit regarding the following:     Chief Complaint   Patient presents with    Hypertension    Follow-up     Echo        HPI: Here for abnormal EKG.  CA score 11 in .    Echo 2024: normal EF, mild MR    Walking daily, no angina.  Some msk issues now, walking some now.  No angina now.  No KAMARA, SOB, CP.      Patient denies recent history of orthopnea, PND, excessive dizziness and/or syncope.        Retired PT       Past Medical History, Past Surgical History, Family history, Social History, and Medications were all reviewed with the patient today and updated as necessary.     Current Outpatient Medications   Medication Sig Dispense Refill    losartan (COZAAR) 25 MG tablet Take 1 tablet by mouth daily      Biotin 1000 MCG TABS Take by mouth daily      Calcium Fructoborate POWD Take 1 capsule by mouth daily      EPINEPHrine (EPIPEN) 0.3 MG/0.3ML SOAJ injection Inject 0.3 mLs into the muscle once as needed      DENTA 5000 PLUS 1.1 % CREA USE AS DIRECTED      Astaxanthin 4 MG CAPS Take 4 mg by mouth daily      TURMERIC PO Take 1 capsule by mouth daily      Cyanocobalamin (VITAMIN B 12 PO) Take by mouth      Magnesium Chloride (MAGNESIUM DR PO) Take by mouth      Calcium-Vitamin D-Vitamin K (CALCIUM FOR WOMEN PO) Take by mouth       No current facility-administered medications for this visit.        Allergies   Allergen Reactions    Penicillins Shortness Of Breath    Erythromycin Nausea And Vomiting     Patient Active Problem List    Diagnosis Date Noted    EKG, abnormal 04/10/2024    Primary hypertension 04/10/2024      No past surgical history on file.  Family History   Problem Relation Age of Onset    Arrhythmia Brother     Hypertension Brother      Social History     Tobacco Use    Smoking

## 2024-07-29 ENCOUNTER — TELEPHONE (OUTPATIENT)
Age: 69
End: 2024-07-29

## 2024-07-29 NOTE — TELEPHONE ENCOUNTER
Cardiac Clearance        Physician or Practice Requesting:[facility]  : Fany Veloz  Contact Phone Number: [phone]  Fax Number: [phone]  Date of Surgery/Procedure: 08/06/24  Type of Surgery or Procedure: right shoulder with loose body removal  possible RCR  Type of Anesthesia: General  Type of Clearance Requested: Cardiac Clearance and Medication Hold  Medication to Hold:?  Days to Hold: ?